# Patient Record
Sex: MALE | Race: WHITE | NOT HISPANIC OR LATINO | URBAN - METROPOLITAN AREA
[De-identification: names, ages, dates, MRNs, and addresses within clinical notes are randomized per-mention and may not be internally consistent; named-entity substitution may affect disease eponyms.]

---

## 2018-07-10 ENCOUNTER — OFFICE VISIT (OUTPATIENT)
Dept: URBAN - METROPOLITAN AREA CLINIC 193 | Facility: CLINIC | Age: 68
End: 2018-07-10
Payer: MEDICARE

## 2018-07-10 DIAGNOSIS — Z96.1 PRESENCE OF INTRAOCULAR LENS: ICD-10-CM

## 2018-07-10 PROCEDURE — 92014 COMPRE OPH EXAM EST PT 1/>: CPT | Performed by: OPTOMETRIST

## 2018-07-10 PROCEDURE — 92004 COMPRE OPH EXAM NEW PT 1/>: CPT | Performed by: OPTOMETRIST

## 2018-07-10 ASSESSMENT — INTRAOCULAR PRESSURE
OD: 22
OS: 21

## 2018-07-10 NOTE — IMPRESSION/PLAN
Impression: Vitreous degeneration, left eye: H43.812. No hemorrhage seen. Plan: Posterior vitreous detachment accounts for the patient's complaints. There is no evidence of associated retinal pathology. All signs and risks of retinal detachment and tears were discussed. Patient instructed to call the office immediately if any symptoms noted.  Plan to bring back soon for glc eval with [facility].

## 2018-07-10 NOTE — IMPRESSION/PLAN
Impression: Ocular hypertension, bilateral: H40.053. High norm IOPs and Cupping compared to prev exam. Plan: GLC: Discussed condition in detail. Further testing needed. Schedule OCT of optic nerve, Visual Field 24-2, and pachymetry next available. Obtain [facility] through [facility] before glc testing.

## 2018-07-10 NOTE — IMPRESSION/PLAN
Impression: Presence of intraocular lens: Z96.1. OU. Partial PCO OS that doesn't seem visually significant. Plan: observe.  obtain [facility] through [facility] at 1 mo glc eval.

## 2018-08-02 ENCOUNTER — OFFICE VISIT (OUTPATIENT)
Dept: URBAN - METROPOLITAN AREA CLINIC 193 | Facility: CLINIC | Age: 68
End: 2018-08-02
Payer: MEDICARE

## 2018-08-02 DIAGNOSIS — H43.812 VITREOUS DEGENERATION, LEFT EYE: ICD-10-CM

## 2018-08-02 PROCEDURE — 92133 CPTRZD OPH DX IMG PST SGM ON: CPT | Performed by: OPTOMETRIST

## 2018-08-02 PROCEDURE — 99213 OFFICE O/P EST LOW 20 MIN: CPT | Performed by: OPTOMETRIST

## 2018-08-02 PROCEDURE — 92083 EXTENDED VISUAL FIELD XM: CPT | Performed by: OPTOMETRIST

## 2018-08-02 PROCEDURE — 76514 ECHO EXAM OF EYE THICKNESS: CPT | Performed by: OPTOMETRIST

## 2018-08-02 ASSESSMENT — INTRAOCULAR PRESSURE
OD: 20
OS: 20

## 2018-08-02 NOTE — IMPRESSION/PLAN
Impression: Presence of intraocular lens: Z96.1. OU. Partial PCO OS that doesn't seem visually significant. Mac OCT: WNL OU. Plan: observe.

## 2018-08-02 NOTE — IMPRESSION/PLAN
Impression: Vitreous degeneration, left eye: H43.812. No hemorrhage seen. Plan: Continue to monitor. Reviewed s/s of RD. Call if worsens.

## 2018-08-02 NOTE — IMPRESSION/PLAN
Impression: Ocular hypertension, bilateral: H40.053. High norm IOPs and signif Cupping compared to prev exam. OCT 8/2/18: avg T WNL OU. VF 8/2/18: WNL OU. Pachs: thick. Plan: GLC: Discussed condition in detail. No treatment advised at this time. Continue to monitor. VF/OCT 8/2019.

## 2020-02-20 ENCOUNTER — OFFICE VISIT (OUTPATIENT)
Dept: URBAN - METROPOLITAN AREA CLINIC 71 | Facility: CLINIC | Age: 70
End: 2020-02-20
Payer: MEDICARE

## 2020-02-20 DIAGNOSIS — H40.053 OCULAR HYPERTENSION, BILATERAL: ICD-10-CM

## 2020-02-20 DIAGNOSIS — D31.32 BENIGN NEOPLASM OF LEFT CHOROID: Primary | ICD-10-CM

## 2020-02-20 DIAGNOSIS — H11.823 CONJUNCTIVOCHALASIS, BILATERAL: ICD-10-CM

## 2020-02-20 PROCEDURE — 99214 OFFICE O/P EST MOD 30 MIN: CPT | Performed by: OPHTHALMOLOGY

## 2020-02-20 ASSESSMENT — INTRAOCULAR PRESSURE
OD: 23
OS: 20

## 2020-02-20 NOTE — IMPRESSION/PLAN
Impression: Vitreous degeneration, bilateral: H43.813. Bilateral.
Optos done 02/20/2020 Plan: Stable, observe.

## 2020-02-20 NOTE — IMPRESSION/PLAN
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 Plan: No signs of tumor or cancer. Recommend observation every 6 months for now.

## 2020-02-20 NOTE — IMPRESSION/PLAN
Impression: Ocular hypertension, bilateral: H40.053. Bilateral. Plan: Ocular hypertension, intraocular pressure is stable. No diagnosis of glaucoma. Will not start medication and will observe.

## 2020-02-20 NOTE — IMPRESSION/PLAN
Impression: Conjunctivochalasis, bilateral: C57.816. Plan: -Not bothering the Pt at this moment, let him know once it starts to bother him give us a call and we will remove them.

## 2021-03-08 ENCOUNTER — OFFICE VISIT (OUTPATIENT)
Dept: URBAN - METROPOLITAN AREA CLINIC 75 | Facility: CLINIC | Age: 71
End: 2021-03-08
Payer: MEDICARE

## 2021-03-08 PROCEDURE — 92014 COMPRE OPH EXAM EST PT 1/>: CPT | Performed by: OPTOMETRIST

## 2021-03-08 ASSESSMENT — INTRAOCULAR PRESSURE
OS: 16
OD: 17

## 2021-03-08 NOTE — IMPRESSION/PLAN
Impression: Vitreous degeneration, bilateral: H43.813- Optos performed reveals a large Artis ring PVD OD. Plan: Posterior vitreous detachment accounts for the patient's complaints. There is no evidence of retinal pathology. All signs and risks of retinal detachment and tears were discussed in detail. Patient instructed to call office immediately if any symptoms noted. Recommend the patient return to the office for consult.

## 2021-05-01 ENCOUNTER — OFFICE VISIT (OUTPATIENT)
Dept: URBAN - METROPOLITAN AREA CLINIC 75 | Facility: CLINIC | Age: 71
End: 2021-05-01
Payer: MEDICARE

## 2021-05-01 DIAGNOSIS — H43.813 VITREOUS DEGENERATION, BILATERAL: Primary | ICD-10-CM

## 2021-05-01 PROCEDURE — 92014 COMPRE OPH EXAM EST PT 1/>: CPT | Performed by: OPTOMETRIST

## 2021-05-01 ASSESSMENT — INTRAOCULAR PRESSURE
OD: 17
OS: 18

## 2021-05-01 NOTE — IMPRESSION/PLAN
Impression: Vitreous degeneration, bilateral: H43.813-The PVD is stable, and there is no evidence of a retinal tear or detachment on dilated exam. Plan: Reviewed the signs and symptoms of a retinal tear and detachment in detail with the patient, including worsening flashes, new floaters, and development of a shadow/curtain in the peripheral visual field. The patient was advised to call immediately with any changes to [address] or increase in symptoms.